# Patient Record
Sex: FEMALE | Race: AMERICAN INDIAN OR ALASKA NATIVE
[De-identification: names, ages, dates, MRNs, and addresses within clinical notes are randomized per-mention and may not be internally consistent; named-entity substitution may affect disease eponyms.]

---

## 2019-02-28 ENCOUNTER — HOSPITAL ENCOUNTER (EMERGENCY)
Dept: HOSPITAL 5 - ED | Age: 44
Discharge: HOME | End: 2019-02-28
Payer: MEDICARE

## 2019-02-28 VITALS — DIASTOLIC BLOOD PRESSURE: 69 MMHG | SYSTOLIC BLOOD PRESSURE: 99 MMHG

## 2019-02-28 DIAGNOSIS — Z88.6: ICD-10-CM

## 2019-02-28 DIAGNOSIS — M06.9: ICD-10-CM

## 2019-02-28 DIAGNOSIS — F17.200: ICD-10-CM

## 2019-02-28 DIAGNOSIS — Z90.710: ICD-10-CM

## 2019-02-28 DIAGNOSIS — M54.30: ICD-10-CM

## 2019-02-28 DIAGNOSIS — M54.16: Primary | ICD-10-CM

## 2019-02-28 DIAGNOSIS — Z88.0: ICD-10-CM

## 2019-02-28 PROCEDURE — 96372 THER/PROPH/DIAG INJ SC/IM: CPT

## 2019-02-28 PROCEDURE — 99282 EMERGENCY DEPT VISIT SF MDM: CPT

## 2019-02-28 NOTE — EMERGENCY DEPARTMENT REPORT
HPI





- General


Chief Complaint: Back Pain/Injury


Time Seen by Provider: 02/28/19 07:08





- HPI


HPI: 





This is a 43-year-old female with a history of pinched nerves are presented to 

ED complaining of acute on chronic buttock pain that is radiating down her thig

hs.  Patient states that about a month ago she got her first episode and was 

seen by urgent care and medicated.  Patient states that symptoms resolved within

about a week ago symptoms returned.  Patient denies any recent trauma injuries 

or falls.  She denies fevers/chills/nausea vomiting/dysuria





ED Past Medical Hx





- Past Medical History


Previous Medical History?: Yes


Hx Arthritis: Yes (Rheumatoid)


Additional medical history: Lupus, Fibromylagia, Pericarditis





- Surgical History


Additional Surgical History: Hysterectomy,





- Social History


Smoking Status: Current Every Day Smoker


Substance Use Type: None





- Medications


Home Medications: 


                                Home Medications











 Medication  Instructions  Recorded  Confirmed  Last Taken  Type


 


Meloxicam [Mobic] 15 mg PO DAILY #30 tablet 02/28/19  Unknown Rx


 


Tizanidine HCl [Zanaflex] 2 mg PO BID PRN #30 capsule 02/28/19  Unknown Rx














ED Review of Systems


ROS: 


Stated complaint: LOWER BACK PAIN


Other details as noted in HPI





Comment: All other systems reviewed and negative





Physical Exam





- Physical Exam


Vital Signs: 


                                   Vital Signs











  02/28/19





  05:33


 


Temperature 98.6 F


 


Pulse Rate 81


 


Respiratory 16





Rate 


 


Blood Pressure 99/69











Physical Exam: 





GENERAL: Alert and oriented x3, no apparent distress, Normal Gait, atraumatic.


HEAD: Head is normocephalic and a-traumatic.


BACK: Full range of motion, no spinal tenderness,  Tenderness to palpation of 

the  latissimus dorsi muscles of the back





EXTREMITIES/MUSCULOSKELETAL: No cyanosis, clubbing, rash, lesions or edema. Full

 ROM bilaterally. UE/LE Pulses 2+ bilaterally.  LE and UE 5+ strength 

bilaterally, straight leg test positive on the left


NEUROLOGIC:  The patient is cooperative with no focal neurologic deficits. 


SKIN:  Warm and dry, No lesions, No ulceration or induration present.





ED Course


                                   Vital Signs











  02/28/19





  05:33


 


Temperature 98.6 F


 


Pulse Rate 81


 


Respiratory 16





Rate 


 


Blood Pressure 99/69














ED Medical Decision Making





- Medical Decision Making





43-year-old female presents to ED with lumbar radiculopathy


ED course: Patient received Toradol and prednisone in ED.


Vital signs are normal patient is in no acute distress


Discussed with patient follow-up with primary care physician.  


Discussed the patient and take medications as prescribed. 


Patient has no neurological deficit.  Patient is alert and oriented 3  and 

understands all instructions given.


 


Critical care attestation.: 


If time is entered above; I have spent that time in minutes in the direct care 

of this critically ill patient, excluding procedure time.








ED Disposition


Clinical Impression: 


 Lumbar radiculopathy, Sciatic leg pain





Disposition: DC-01 TO HOME OR SELFCARE


Is pt being admited?: No


Does the pt Need Aspirin: No


Condition: Stable


Instructions:  Lumbar Radiculopathy (ED), Arthralgia (ED), Heat Pack Application

 (ED)


Additional Instructions: 


Make sure to follow up with the primary care physician as discussed.


Take all your medications as you've been prescribed.


If you have any worsening symptoms or develop new symptoms please return to ED 

immediately.


Prescriptions: 


Meloxicam [Mobic] 15 mg PO DAILY #30 tablet


Tizanidine HCl [Zanaflex] 2 mg PO BID PRN #30 capsule


 PRN Reason: Muscle Spasm


Referrals: 


SHARATH BUI MD [Primary Care Provider] - 3-5 Days


MARYCARMEN REEDER MD [Staff Physician] - 3-5 Days


Forms:  Work/School Release Form(ED)


Time of Disposition: 07:47